# Patient Record
(demographics unavailable — no encounter records)

---

## 2024-12-19 NOTE — PHYSICAL EXAM
[Normal] : affect was normal and insight and judgment were intact [de-identified] : full rom of left shoulder, 5/5 strength of left shoulder joint, sensation fully intact [de-identified] : TTP over posterior aspect of left shoulder

## 2024-12-19 NOTE — PHYSICAL EXAM
[Normal] : affect was normal and insight and judgment were intact [de-identified] : full rom of left shoulder, 5/5 strength of left shoulder joint, sensation fully intact [de-identified] : TTP over posterior aspect of left shoulder

## 2024-12-19 NOTE — HISTORY OF PRESENT ILLNESS
[FreeTextEntry1] : cold like symptoms, left shoulder pain [de-identified] : 64-year-old female with PMH of LBP, HDL and prediabetes presenting to clinic for items below. Used Slovenian  [id number].   (1) cold like symptoms with lingering cough. Reports cold like symptoms last week with productive cough. Only cough has persisted over past week, but it has become non productive. She complained that the cough is worse when she lays down in bed over the past week. She denies any other respiratory symptoms during this entire course, denies fevers, chills, chest pain, and leg swelling. She tried NyQuil for the cough, and this helped somewhat. She is concerned that she might have asthma.   (2) left shoulder pain. has several months of moderate electric-like left shoulder pain that is episodic in nature, but notably worsened by taking her shirt off and generalized movement. The pain specifically is localized to posterior aspect of left shoulder and radiates down shoulder to elbow, but stops at elbow. There was no inciting trauma to the shoulder or neck. Concerned because she feels that pain is worsening. On physical exam, no erythema or swelling on or around shoulder joint. Sensation and motor strength fully intact. Mild pain elicited with left shoulder abduction.   (3) left lateral heel pain. Has sharp pain of left heel, specifically localized posterior to lateral malleolus. On physical exam, mild TTP and mild erythema where he pain is, appears to be right where shoe rubs against ankle. Sensation and motor strength fully intact.

## 2024-12-19 NOTE — HISTORY OF PRESENT ILLNESS
[FreeTextEntry1] : cold like symptoms, left shoulder pain [de-identified] : 64-year-old female with PMH of LBP, HDL and prediabetes presenting to clinic for items below. Used Mongolian  [id number].   (1) cold like symptoms with lingering cough. Reports cold like symptoms last week with productive cough. Only cough has persisted over past week, but it has become non productive. She complained that the cough is worse when she lays down in bed over the past week. She denies any other respiratory symptoms during this entire course, denies fevers, chills, chest pain, and leg swelling. She tried NyQuil for the cough, and this helped somewhat. She is concerned that she might have asthma.   (2) left shoulder pain. has several months of moderate electric-like left shoulder pain that is episodic in nature, but notably worsened by taking her shirt off and generalized movement. The pain specifically is localized to posterior aspect of left shoulder and radiates down shoulder to elbow, but stops at elbow. There was no inciting trauma to the shoulder or neck. Concerned because she feels that pain is worsening. On physical exam, no erythema or swelling on or around shoulder joint. Sensation and motor strength fully intact. Mild pain elicited with left shoulder abduction.   (3) left lateral heel pain. Has sharp pain of left heel, specifically localized posterior to lateral malleolus. On physical exam, mild TTP and mild erythema where he pain is, appears to be right where shoe rubs against ankle. Sensation and motor strength fully intact.

## 2024-12-19 NOTE — HISTORY OF PRESENT ILLNESS
[FreeTextEntry1] : cold like symptoms, left shoulder pain [de-identified] : 64-year-old female with PMH of LBP, HDL and prediabetes presenting to clinic for items below. Used Romanian  [id number].   (1) cold like symptoms with lingering cough. Reports cold like symptoms last week with productive cough. Only cough has persisted over past week, but it has become non productive. She complained that the cough is worse when she lays down in bed over the past week. She denies any other respiratory symptoms during this entire course, denies fevers, chills, chest pain, and leg swelling. She tried NyQuil for the cough, and this helped somewhat. She is concerned that she might have asthma.   (2) left shoulder pain. has several months of moderate electric-like left shoulder pain that is episodic in nature, but notably worsened by taking her shirt off and generalized movement. The pain specifically is localized to posterior aspect of left shoulder and radiates down shoulder to elbow, but stops at elbow. There was no inciting trauma to the shoulder or neck. Concerned because she feels that pain is worsening. On physical exam, no erythema or swelling on or around shoulder joint. Sensation and motor strength fully intact. Mild pain elicited with left shoulder abduction.   (3) left lateral heel pain. Has sharp pain of left heel, specifically localized posterior to lateral malleolus. On physical exam, mild TTP and mild erythema where he pain is, appears to be right where shoe rubs against ankle. Sensation and motor strength fully intact.

## 2024-12-19 NOTE — ASSESSMENT
[FreeTextEntry1] : 64-year-old female with PMH of LBP, HDL and prediabetes presenting to clinic for items below.  #Cough - 1 week of dry cough following cold like symptoms with productive cough. worse on laying down. most likely has post nasal drip induced cough. very unlikely cardiac or pulmonary etiology given isolated cough directly following viral like cold symptoms.   Plan (1) Tessalon antitussive  #Left shoulder pain - most likely rotator cuff tendonitis given indolent course, age, left handed so overuse as risk factor. not rotator cuff tear b/c course too chronic and pain is not severe enough. not adhesive capsulitis b/c shoulder joint not stiff. not AC joint OA given TTP over posterior shoulder muscles.   Plan (1) conservative OTC pain meds (2) PT referral  #Left heel pain - possibly osteophyte of ankle. possibly friction dermatitis 2/2 shoe rubbing against skin.   Plan (1) left foot x-ray to evaluate for osteophyte (2) conservative OTC pain meds

## 2024-12-19 NOTE — PHYSICAL EXAM
[Normal] : affect was normal and insight and judgment were intact [de-identified] : full rom of left shoulder, 5/5 strength of left shoulder joint, sensation fully intact [de-identified] : TTP over posterior aspect of left shoulder